# Patient Record
Sex: FEMALE | Race: OTHER | Employment: FULL TIME | URBAN - METROPOLITAN AREA
[De-identification: names, ages, dates, MRNs, and addresses within clinical notes are randomized per-mention and may not be internally consistent; named-entity substitution may affect disease eponyms.]

---

## 2020-11-14 ENCOUNTER — HOSPITAL ENCOUNTER (EMERGENCY)
Age: 25
Discharge: HOME | End: 2020-11-14
Attending: EMERGENCY MEDICINE
Payer: COMMERCIAL

## 2020-11-14 VITALS
SYSTOLIC BLOOD PRESSURE: 111 MMHG | DIASTOLIC BLOOD PRESSURE: 53 MMHG | HEART RATE: 58 BPM | WEIGHT: 190 LBS | BODY MASS INDEX: 33.66 KG/M2 | OXYGEN SATURATION: 100 % | TEMPERATURE: 98.6 F | HEIGHT: 63 IN | RESPIRATION RATE: 18 BRPM

## 2020-11-14 LAB
ALBUMIN SERPL-MCNC: 4.4 G/DL (ref 3.4–5)
ALP BLD-CCNC: 61 U/L (ref 40–129)
ALT SERPL-CCNC: 7 U/L (ref 10–40)
ANION GAP SERPL CALCULATED.3IONS-SCNC: 12 MMOL/L (ref 3–16)
AST SERPL-CCNC: 11 U/L (ref 15–37)
BACTERIA: ABNORMAL /HPF
BASOPHILS ABSOLUTE: 0 K/UL (ref 0–0.2)
BASOPHILS RELATIVE PERCENT: 0.7 %
BILIRUB SERPL-MCNC: <0.2 MG/DL (ref 0–1)
BILIRUBIN DIRECT: <0.2 MG/DL (ref 0–0.3)
BILIRUBIN URINE: NEGATIVE
BILIRUBIN, INDIRECT: ABNORMAL MG/DL (ref 0–1)
BLOOD, URINE: ABNORMAL
BUN BLDV-MCNC: 6 MG/DL (ref 7–20)
CALCIUM SERPL-MCNC: 8.9 MG/DL (ref 8.3–10.6)
CHLORIDE BLD-SCNC: 103 MMOL/L (ref 99–110)
CLARITY: ABNORMAL
CO2: 24 MMOL/L (ref 21–32)
COLOR: YELLOW
CREAT SERPL-MCNC: 0.5 MG/DL (ref 0.6–1.1)
EOSINOPHILS ABSOLUTE: 0.1 K/UL (ref 0–0.6)
EOSINOPHILS RELATIVE PERCENT: 0.8 %
EPITHELIAL CELLS, UA: ABNORMAL /HPF (ref 0–5)
GFR AFRICAN AMERICAN: >60
GFR NON-AFRICAN AMERICAN: >60
GLUCOSE BLD-MCNC: 102 MG/DL (ref 70–99)
GLUCOSE URINE: NEGATIVE MG/DL
HCG(URINE) PREGNANCY TEST: NEGATIVE
HCT VFR BLD CALC: 26.4 % (ref 36–48)
HEMATOLOGY PATH CONSULT: YES
HEMOGLOBIN: 7.8 G/DL (ref 12–16)
KETONES, URINE: NEGATIVE MG/DL
LEUKOCYTE ESTERASE, URINE: ABNORMAL
LIPASE: 19 U/L (ref 13–60)
LYMPHOCYTES ABSOLUTE: 2.3 K/UL (ref 1–5.1)
LYMPHOCYTES RELATIVE PERCENT: 37 %
MCH RBC QN AUTO: 17.4 PG (ref 26–34)
MCHC RBC AUTO-ENTMCNC: 29.7 G/DL (ref 31–36)
MCV RBC AUTO: 58.6 FL (ref 80–100)
MICROSCOPIC EXAMINATION: YES
MONOCYTES ABSOLUTE: 0.6 K/UL (ref 0–1.3)
MONOCYTES RELATIVE PERCENT: 9.1 %
MUCUS: ABNORMAL /LPF
NEUTROPHILS ABSOLUTE: 3.2 K/UL (ref 1.7–7.7)
NEUTROPHILS RELATIVE PERCENT: 52.4 %
NITRITE, URINE: NEGATIVE
PDW BLD-RTO: 21.7 % (ref 12.4–15.4)
PH UA: 6 (ref 5–8)
PLATELET # BLD: 201 K/UL (ref 135–450)
PMV BLD AUTO: 8.2 FL (ref 5–10.5)
POTASSIUM REFLEX MAGNESIUM: 3.9 MMOL/L (ref 3.5–5.1)
PROTEIN UA: 100 MG/DL
RBC # BLD: 4.5 M/UL (ref 4–5.2)
RBC UA: ABNORMAL /HPF (ref 0–4)
SODIUM BLD-SCNC: 139 MMOL/L (ref 136–145)
SPECIFIC GRAVITY UA: 1.02 (ref 1–1.03)
TOTAL PROTEIN: 7.8 G/DL (ref 6.4–8.2)
URINE TYPE: ABNORMAL
UROBILINOGEN, URINE: 0.2 E.U./DL
WBC # BLD: 6.1 K/UL (ref 4–11)
WBC UA: ABNORMAL /HPF (ref 0–5)

## 2020-11-14 PROCEDURE — 96365 THER/PROPH/DIAG IV INF INIT: CPT

## 2020-11-14 PROCEDURE — 80076 HEPATIC FUNCTION PANEL: CPT

## 2020-11-14 PROCEDURE — 84703 CHORIONIC GONADOTROPIN ASSAY: CPT

## 2020-11-14 PROCEDURE — 83690 ASSAY OF LIPASE: CPT

## 2020-11-14 PROCEDURE — 2580000003 HC RX 258: Performed by: EMERGENCY MEDICINE

## 2020-11-14 PROCEDURE — 96375 TX/PRO/DX INJ NEW DRUG ADDON: CPT

## 2020-11-14 PROCEDURE — 85025 COMPLETE CBC W/AUTO DIFF WBC: CPT

## 2020-11-14 PROCEDURE — 81001 URINALYSIS AUTO W/SCOPE: CPT

## 2020-11-14 PROCEDURE — 80048 BASIC METABOLIC PNL TOTAL CA: CPT

## 2020-11-14 PROCEDURE — 6360000002 HC RX W HCPCS: Performed by: EMERGENCY MEDICINE

## 2020-11-14 PROCEDURE — 99283 EMERGENCY DEPT VISIT LOW MDM: CPT

## 2020-11-14 PROCEDURE — 36415 COLL VENOUS BLD VENIPUNCTURE: CPT

## 2020-11-14 RX ORDER — CEFUROXIME AXETIL 250 MG/1
250 TABLET ORAL 2 TIMES DAILY
Qty: 20 TABLET | Refills: 0 | Status: SHIPPED | OUTPATIENT
Start: 2020-11-14 | End: 2020-11-24

## 2020-11-14 RX ORDER — KETOROLAC TROMETHAMINE 30 MG/ML
15 INJECTION, SOLUTION INTRAMUSCULAR; INTRAVENOUS ONCE
Status: COMPLETED | OUTPATIENT
Start: 2020-11-14 | End: 2020-11-14

## 2020-11-14 RX ORDER — 0.9 % SODIUM CHLORIDE 0.9 %
1000 INTRAVENOUS SOLUTION INTRAVENOUS ONCE
Status: COMPLETED | OUTPATIENT
Start: 2020-11-14 | End: 2020-11-14

## 2020-11-14 RX ADMIN — SODIUM CHLORIDE 1000 ML: 9 INJECTION, SOLUTION INTRAVENOUS at 01:47

## 2020-11-14 RX ADMIN — KETOROLAC TROMETHAMINE 15 MG: 30 INJECTION, SOLUTION INTRAMUSCULAR at 01:47

## 2020-11-14 RX ADMIN — CEFTRIAXONE 1 G: 1 INJECTION, POWDER, FOR SOLUTION INTRAMUSCULAR; INTRAVENOUS at 03:00

## 2020-11-14 ASSESSMENT — ENCOUNTER SYMPTOMS
ABDOMINAL PAIN: 1
DIARRHEA: 0
COUGH: 0
NAUSEA: 0
VOMITING: 0
SHORTNESS OF BREATH: 0

## 2020-11-14 ASSESSMENT — PAIN DESCRIPTION - ORIENTATION: ORIENTATION: LOWER

## 2020-11-14 ASSESSMENT — PAIN SCALES - GENERAL
PAINLEVEL_OUTOF10: 6
PAINLEVEL_OUTOF10: 6

## 2020-11-14 ASSESSMENT — PAIN DESCRIPTION - LOCATION: LOCATION: ABDOMEN;BACK

## 2020-11-14 ASSESSMENT — PAIN DESCRIPTION - PAIN TYPE: TYPE: ACUTE PAIN

## 2020-11-14 NOTE — ED PROVIDER NOTES
[address] [facility] [place] [facility]          ATTENDING PHYSICIAN NOTE       Date of evaluation: 11/14/2020    Chief Complaint     Lower Back Pain; Dysuria; and Abdominal Pain      History of Present Illness     Bronwyn Sandhoff is a 22 y.o. female who presents with complaints of right greater than left flank pain that began yesterday but has gotten worse tonight. She has no associated nausea and vomiting. The pain occasionally radiates around to the front right upper quadrant area. She states that it is at a 6-8 level. She reports urinary frequency but denies any dysuria or urgency and has not noted any blood in her urine. She denies any fevers or chills. The pain is made worse when she moves or when she takes in a deep breath. Review of Systems     Review of Systems   Constitutional: Negative for chills and fever. Respiratory: Negative for cough and shortness of breath. Cardiovascular: Negative for chest pain. Gastrointestinal: Positive for abdominal pain. Negative for diarrhea, nausea and vomiting. Genitourinary: Positive for flank pain and frequency. Negative for difficulty urinating and dysuria. All other systems reviewed and are negative. Past Medical, Surgical, Family, and Social History     She has no past medical history on file. She has no past surgical history on file. Her family history is not on file. She reports that she has never smoked. She has never used smokeless tobacco. She reports previous alcohol use. She reports that she does not use drugs. Medications     Previous Medications    No medications on file       Allergies     She is allergic to shellfish-derived products. Physical Exam     INITIAL VITALS: BP: 116/70, Temp: 98.6 °F (37 °C), Pulse: 85, Resp: 18, SpO2: 100 %   Physical Exam  Vitals signs and nursing note reviewed. Constitutional:       General: She is not in acute distress. Appearance: She is well-developed. She is not diaphoretic. Neck:      Musculoskeletal: Normal range of motion. Cardiovascular:      Rate and Rhythm: Normal rate and regular rhythm. Pulmonary:      Effort: Pulmonary effort is normal.   Abdominal:      General: Bowel sounds are normal. There is no distension. Palpations: Abdomen is soft. Tenderness: There is no abdominal tenderness. There is right CVA tenderness and left CVA tenderness. There is no guarding or rebound. Musculoskeletal: Normal range of motion. Skin:     General: Skin is warm and dry. Neurological:      Mental Status: She is alert and oriented to person, place, and time.    Psychiatric:         Behavior: Behavior normal.         Diagnostic Results     RADIOLOGY:  No orders to display       LABS:   Results for orders placed or performed during the hospital encounter of 11/14/20   Lipase   Result Value Ref Range    Lipase 19.0 13.0 - 60.0 U/L   Hepatic Function Panel   Result Value Ref Range    Total Protein 7.8 6.4 - 8.2 g/dL    Alb 4.4 3.4 - 5.0 g/dL    Alkaline Phosphatase 61 40 - 129 U/L    ALT 7 (L) 10 - 40 U/L    AST 11 (L) 15 - 37 U/L    Total Bilirubin <0.2 0.0 - 1.0 mg/dL    Bilirubin, Direct <0.2 0.0 - 0.3 mg/dL    Bilirubin, Indirect see below 0.0 - 1.0 mg/dL   Basic Metabolic Panel w/ Reflex to MG   Result Value Ref Range    Sodium 139 136 - 145 mmol/L    Potassium reflex Magnesium 3.9 3.5 - 5.1 mmol/L    Chloride 103 99 - 110 mmol/L    CO2 24 21 - 32 mmol/L    Anion Gap 12 3 - 16    Glucose 102 (H) 70 - 99 mg/dL    BUN 6 (L) 7 - 20 mg/dL    CREATININE 0.5 (L) 0.6 - 1.1 mg/dL    GFR Non-African American >60 >60    GFR African American >60 >60    Calcium 8.9 8.3 - 10.6 mg/dL   CBC Auto Differential   Result Value Ref Range    WBC 6.1 4.0 - 11.0 K/uL    RBC 4.50 4.00 - 5.20 M/uL    Hemoglobin 7.8 (L) 12.0 - 16.0 g/dL    Hematocrit 26.4 (L) 36.0 - 48.0 %    MCV 58.6 (L) 80.0 - 100.0 fL    MCH 17.4 (L) 26.0 - 34.0 pg    MCHC 29.7 (L) 31.0 - 36.0 g/dL    RDW 21.7 (H) 12.4 - 15.4 % Platelets 634 154 - 219 K/uL    MPV 8.2 5.0 - 10.5 fL    Neutrophils % 52.4 %    Lymphocytes % 37.0 %    Monocytes % 9.1 %    Eosinophils % 0.8 %    Basophils % 0.7 %    Neutrophils Absolute 3.2 1.7 - 7.7 K/uL    Lymphocytes Absolute 2.3 1.0 - 5.1 K/uL    Monocytes Absolute 0.6 0.0 - 1.3 K/uL    Eosinophils Absolute 0.1 0.0 - 0.6 K/uL    Basophils Absolute 0.0 0.0 - 0.2 K/uL   Pregnancy, Urine   Result Value Ref Range    HCG(Urine) Pregnancy Test Negative Detects HCG level >20 MIU/mL   Urinalysis, reflex to microscopic   Result Value Ref Range    Color, UA Yellow Straw/Yellow    Clarity, UA SL CLOUDY (A) Clear    Glucose, Ur Negative Negative mg/dL    Bilirubin Urine Negative Negative    Ketones, Urine Negative Negative mg/dL    Specific Gravity, UA 1.020 1.005 - 1.030    Blood, Urine LARGE (A) Negative    pH, UA 6.0 5.0 - 8.0    Protein,  (A) Negative mg/dL    Urobilinogen, Urine 0.2 <2.0 E.U./dL    Nitrite, Urine Negative Negative    Leukocyte Esterase, Urine LARGE (A) Negative    Microscopic Examination YES     Urine Type Voided    Microscopic Urinalysis   Result Value Ref Range    Mucus, UA 1+ (A) None Seen /LPF    WBC, UA  (A) 0 - 5 /HPF    RBC, UA 5-10 (A) 0 - 4 /HPF    Epithelial Cells, UA 6-10 (A) 0 - 5 /HPF    Bacteria, UA 1+ (A) None Seen /HPF       RECENT VITALS:  BP: 116/70,Temp: 98.6 °F (37 °C), Pulse: 85, Resp: 18, SpO2: 100 %       ED Course     Nursing Notes, Past Medical Hx, Past Surgical Hx, Social Hx,Allergies, and Family Hx were reviewed.     patient was given the following medications:  Orders Placed This Encounter   Medications    ketorolac (TORADOL) injection 15 mg    0.9 % sodium chloride bolus    cefUROXime (CEFTIN) 250 MG tablet     Sig: Take 1 tablet by mouth 2 times daily for 10 days     Dispense:  20 tablet     Refill:  0    cefTRIAXone (ROCEPHIN) 1 g IVPB in 50 mL D5W minibag     Order Specific Question:   Antimicrobial Indications     Answer:   Urinary Tract Infection CONSULTS:  None    MEDICAL DECISIONMAKING / ASSESSMENT / Serandreea Mayorga is a 22 y.o. female presenting with right greater than left flank pain that is been gradual in onset for the last couple of days. She is afebrile and not vomiting. She was tender on examination over the right flank especially but she had no abdominal tenderness. Symptoms were suggestive of a kidney etiology and laboratory evaluation reveals normal white counts and other labs but evidence of urinary tract infection making pyelonephritis the most likely diagnosis. I do not feel she has a kidney stone given the gradual onset of symptoms and relative paucity of blood in the urine. She is also not having any nausea or vomiting. I feel she will do well with oral antibiotics but will receive IV Rocephin here prior to discharge. Clinical Impression     1.  Acute pyelonephritis        Disposition     PATIENT REFERRED TO:  [facility] Emergency Department  [address]  [address] [facility]    If symptoms worsen    [place] [id number]  [phone]      to schedule new primary care followup      DISCHARGE MEDICATIONS:  New Prescriptions    CEFUROXIME (CEFTIN) 250 MG TABLET    Take 1 tablet by mouth 2 times daily for 10 days       DISPOSITION Discharge - Pending Orders Complete 11/14/2020 02:53:19 AM       Jostin Tubbs MD  11/14/20 [id number]

## 2020-11-14 NOTE — ED NOTES
Pt discharged to home, alert and oriented. Denies any questions about discharge instructions. Will follow up as directed. encouraged to return for any worsening symptoms.         Marni Lao RN  11/14/20 [id number]

## 2020-11-15 LAB — HEMATOLOGY PATH CONSULT: NORMAL

## 2021-02-07 ENCOUNTER — HOSPITAL ENCOUNTER (EMERGENCY)
Age: 26
Discharge: HOME | End: 2021-02-07
Attending: STUDENT IN AN ORGANIZED HEALTH CARE EDUCATION/TRAINING PROGRAM
Payer: COMMERCIAL

## 2021-02-07 VITALS
RESPIRATION RATE: 16 BRPM | SYSTOLIC BLOOD PRESSURE: 117 MMHG | OXYGEN SATURATION: 100 % | HEART RATE: 64 BPM | DIASTOLIC BLOOD PRESSURE: 70 MMHG

## 2021-02-07 DIAGNOSIS — M54.50 ACUTE BILATERAL LOW BACK PAIN WITHOUT SCIATICA: Primary | ICD-10-CM

## 2021-02-07 LAB
BACTERIA: ABNORMAL /HPF
BILIRUBIN URINE: ABNORMAL
BLOOD, URINE: NEGATIVE
CLARITY: ABNORMAL
COLOR: YELLOW
EPITHELIAL CELLS, UA: ABNORMAL /HPF (ref 0–5)
GLUCOSE URINE: NEGATIVE MG/DL
HCG(URINE) PREGNANCY TEST: NEGATIVE
KETONES, URINE: 15 MG/DL
LEUKOCYTE ESTERASE, URINE: ABNORMAL
MICROSCOPIC EXAMINATION: YES
NITRITE, URINE: NEGATIVE
PH UA: 6 (ref 5–8)
PROTEIN UA: NEGATIVE MG/DL
RBC UA: ABNORMAL /HPF (ref 0–4)
SPECIFIC GRAVITY UA: >=1.03 (ref 1–1.03)
URINE TYPE: ABNORMAL
UROBILINOGEN, URINE: 0.2 E.U./DL
WBC UA: ABNORMAL /HPF (ref 0–5)

## 2021-02-07 PROCEDURE — 81001 URINALYSIS AUTO W/SCOPE: CPT

## 2021-02-07 PROCEDURE — 84703 CHORIONIC GONADOTROPIN ASSAY: CPT

## 2021-02-07 PROCEDURE — 99283 EMERGENCY DEPT VISIT LOW MDM: CPT

## 2021-02-07 RX ORDER — CEPHALEXIN 500 MG/1
500 CAPSULE ORAL 2 TIMES DAILY
Qty: 14 CAPSULE | Refills: 0 | Status: SHIPPED | OUTPATIENT
Start: 2021-02-07 | End: 2021-02-14

## 2021-02-07 ASSESSMENT — ENCOUNTER SYMPTOMS
EYES NEGATIVE: 1
ALLERGIC/IMMUNOLOGIC NEGATIVE: 1
BACK PAIN: 1
VOMITING: 0
NAUSEA: 0
RESPIRATORY NEGATIVE: 1

## 2021-02-07 ASSESSMENT — PAIN DESCRIPTION - DESCRIPTORS: DESCRIPTORS: ACHING

## 2021-02-07 ASSESSMENT — PAIN DESCRIPTION - FREQUENCY: FREQUENCY: CONTINUOUS

## 2021-02-07 ASSESSMENT — PAIN DESCRIPTION - ORIENTATION: ORIENTATION: LOWER

## 2021-02-07 NOTE — ED PROVIDER NOTES
ED Attending Attestation Note     Date of evaluation: 2/7/2021    This patient was seen by the resident. I have seen and examined the patient, agree with the workup, evaluation, management and diagnosis. The care plan has been discussed. My assessment reveals 21 y/o F who presents for lower back pain. Worse with bending/twisting. She does not recall recent trauma or heavy lifting, but has been spending quite a bit of time sitting in a chair. Denies midline pain, saddle paraesthesias, leg weakness, urinary retention, dysuria, frequency, or fever. She has not taken anything at home for the pain. No midline or CVA tenderness on exam, abd soft without rebound or guarding. BLE strength intact. UPT negative. UA with bacteria and trace leukocyte esterase. Although her presentation seems most consistent with musculoskeletal pain, gave prescription to cover for potential UTI. Counseled on supportive care. ED precautions for worsening symptoms.      Ruchi Sequeira MD  02/07/21 [id number]

## 2021-02-07 NOTE — ED PROVIDER NOTES
[facility] ENCOUNTER          EM RESIDENT NOTE       Date of evaluation: 2/7/2021    Chief Complaint     Back Pain (pt thinks she has kidney infection)      History of Present Illness     Alyssa Cárdenas is a 22 y.o. female with no significant past medical history that is presenting with back pain. The patient states that she started to develop left greater than right paraspinal lumbar back pain that was similar to when she had pyelonephritis 2 months ago and was treated with antibiotics. The patient states that she has not had any pain with urination or any hematuria. She notes that she is unclear if she is pregnant. Patient is not have any abdominal pain currently. She does state that she has not been able to have a bowel movement for the past 3 days; prior to then, the patient states she did not have any diarrhea or bloody stools. With the exception of above, the patient does not endorse any alleviating or aggravating factors, and does not note any further complaints. Review of Systems     Review of Systems   Constitutional: Negative for fever. HENT: Negative. Eyes: Negative. Respiratory: Negative. Cardiovascular: Negative for chest pain. Gastrointestinal: Negative for nausea and vomiting. Endocrine: Negative. Genitourinary: Positive for flank pain. Negative for decreased urine volume, dysuria, hematuria, urgency, vaginal bleeding, vaginal discharge and vaginal pain. Musculoskeletal: Positive for back pain. Allergic/Immunologic: Negative. Neurological: Negative. Hematological: Negative. Psychiatric/Behavioral: Negative. Past Medical, Surgical, Family, and Social History     She has no past medical history on file. She has no past surgical history on file. Her family history is not on file. She reports that she has never smoked. She has never used smokeless tobacco. She reports previous alcohol use.  She reports that she does not use drugs.    Medications     Previous Medications    No medications on file       Allergies     She is allergic to shellfish-derived products. Physical Exam     IINITIAL VITALS: BP: 117/70,  , Pulse: 64, Resp: 16, SpO2: 100 %     General: Patient is a 22 y.o. female who is not in acute distress    HEENT: Head atraumatic. Pupils equal, round, and reactive to light. Sclera clear. Mucous membranes moist. Oropharynx clear. Neck:  Supple. No lymphadenopathy. Pulmonary:   Normal respiratory effort. Lungs clear to auscultation bilaterally, with no wheezes, rales or rhonchi. Cardiac:  Regular rate and rhythm. Normal S1, S2. No murmurs, rubs or gallops. Abdomen:  Soft. Non-tender. Non-distended. No rebound tenderness or guarding. Musculoskeletal:  No obvious deformities. TTP to the left lumbar paraspinal area and right lumbar paraspinal area, L>R. Vascular:  Radial pulses 2+ bilaterally. Skin:  Warm, dry, well-perfused. No rash. Neuro: AAOx4. CN II-XII grossly intact. Normal gait. Sensation grossly intact. Strength grossly equal and symmetric. Extremities:  No peripheral edema.     Diagnostic Results     RADIOLOGY:  No orders to display       LABS:   Results for orders placed or performed during the hospital encounter of 02/07/21   Urinalysis, reflex to microscopic (Lab)   Result Value Ref Range    Color, UA Yellow Straw/Yellow    Clarity, UA SL CLOUDY (A) Clear    Glucose, Ur Negative Negative mg/dL    Bilirubin Urine SMALL (A) Negative    Ketones, Urine 15 (A) Negative mg/dL    Specific Gravity, UA >=1.030 1.005 - 1.030    Blood, Urine Negative Negative    pH, UA 6.0 5.0 - 8.0    Protein, UA Negative Negative mg/dL    Urobilinogen, Urine 0.2 <2.0 E.U./dL    Nitrite, Urine Negative Negative    Leukocyte Esterase, Urine TRACE (A) Negative    Microscopic Examination YES     Urine Type NotGiven    Pregnancy, urine   Result Value Ref Range    HCG(Urine) Pregnancy Test Negative Detects HCG level >20 MIU/mL   Microscopic Urinalysis   Result Value Ref Range    WBC, UA 3-5 0 - 5 /HPF    RBC, UA None seen 0 - 4 /HPF    Epithelial Cells, UA 2-5 0 - 5 /HPF    Bacteria, UA 2+ (A) None Seen /HPF         RECENT VITALS:  BP: 117/70,  , Pulse: 64, Resp: 16, SpO2: 100 %     Procedures     None    ED Course     Nursing Notes, Past Medical Hx, Past Surgical Hx, Social Hx, Allergies, and Family Hx were reviewed. The patient was given the following medications:  No orders of the defined types were placed in this encounter. CONSULTS:  None    MEDICAL [place] Shaver / TAURUS / Ehsan Jeannette is a 22 y.o. female that is presenting with back pain. The patient's UA was significant for 2+ bacteria and very mild WBC count; after discussion with the patient, we discussed how this could likely not be a UTI and is rather due to musculoskeletal back pain given non-impressive UA. After risk benefit discussion, patient stated that she would rather have a course of antibiotics for her symptoms and therefore was given a course of Keflex for 7 days to treat for potential UTI. Patient also encouraged to take ibuprofen every 8 hours for pain as well and was agreeable to plan. This patient was also evaluated by the attending physician. All care plans were discussed and agreed upon. Clinical Impression     1. Acute bilateral low back pain without sciatica        Disposition     PATIENT REFERRED TO:  No follow-up provider specified.     DISCHARGE MEDICATIONS:  New Prescriptions    No medications on file       Elizabeth Freeman MD  Resident  02/07/21 [id number]

## 2021-05-04 ENCOUNTER — APPOINTMENT (OUTPATIENT)
Dept: GENERAL RADIOLOGY | Age: 26
End: 2021-05-04
Payer: COMMERCIAL

## 2021-05-04 ENCOUNTER — HOSPITAL ENCOUNTER (EMERGENCY)
Age: 26
Discharge: HOME | End: 2021-05-04
Payer: COMMERCIAL

## 2021-05-04 VITALS
TEMPERATURE: 97.4 F | RESPIRATION RATE: 18 BRPM | OXYGEN SATURATION: 100 % | DIASTOLIC BLOOD PRESSURE: 75 MMHG | SYSTOLIC BLOOD PRESSURE: 110 MMHG | HEART RATE: 77 BPM

## 2021-05-04 DIAGNOSIS — M25.571 ACUTE RIGHT ANKLE PAIN: ICD-10-CM

## 2021-05-04 DIAGNOSIS — M25.532 ACUTE WRIST PAIN, LEFT: Primary | ICD-10-CM
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
## 2021-05-04 PROCEDURE — 73630 X-RAY EXAM OF FOOT: CPT

## 2021-05-04 PROCEDURE — 73610 X-RAY EXAM OF ANKLE: CPT

## 2021-05-04 PROCEDURE — 99284 EMERGENCY DEPT VISIT MOD MDM: CPT

## 2021-05-04 PROCEDURE — 73110 X-RAY EXAM OF WRIST: CPT

## 2021-05-04 RX ORDER — NAPROXEN 500 MG/1
500 TABLET ORAL 2 TIMES DAILY PRN
Qty: 30 TABLET | Refills: 0 | Status: SHIPPED | OUTPATIENT
Start: 2021-05-04

## 2021-05-04 ASSESSMENT — PAIN DESCRIPTION - LOCATION
LOCATION_2: WRIST
LOCATION: ANKLE

## 2021-05-04 ASSESSMENT — ENCOUNTER SYMPTOMS
SHORTNESS OF BREATH: 0
WHEEZING: 0
ABDOMINAL PAIN: 0
NAUSEA: 0
SORE THROAT: 0
DIARRHEA: 0
VOMITING: 0
COUGH: 0

## 2021-05-04 ASSESSMENT — PAIN DESCRIPTION - INTENSITY: RATING_2: 3

## 2021-05-04 ASSESSMENT — PAIN SCALES - GENERAL: PAINLEVEL_OUTOF10: 5

## 2021-05-04 ASSESSMENT — PAIN DESCRIPTION - DESCRIPTORS
DESCRIPTORS: ACHING
DESCRIPTORS_2: ACHING

## 2021-05-04 ASSESSMENT — PAIN DESCRIPTION - ORIENTATION
ORIENTATION_2: LEFT
ORIENTATION: RIGHT

## 2021-05-04 ASSESSMENT — PAIN DESCRIPTION - FREQUENCY: FREQUENCY: CONTINUOUS

## 2021-05-04 NOTE — ED PROVIDER NOTES
myalgias. Left wrist and right ankle pain   Skin: Negative for rash. Neurological: Negative for dizziness, seizures, weakness and headaches. Hematological: Does not bruise/bleed easily. Psychiatric/Behavioral: Negative for hallucinations and suicidal ideas. All other systems reviewed and are negative. Past Medical, Surgical, Family, and Social History     She has a past medical history of Anemia. She has a past surgical history that includes  section and Mouth surgery. Her family history is not on file. She reports that she has never smoked. She has never used smokeless tobacco. She reports previous alcohol use. She reports that she does not use drugs. Medications     Discharge Medication List as of 2021  9:10 PM          Allergies     She is allergic to shellfish-derived products. Physical Exam     INITIAL VITALS: BP: 117/68, Temp: 97.4 °F (36.3 °C), Pulse: 77, Resp: 18, SpO2: 100 %  Physical Exam  Vitals signs and nursing note reviewed. Constitutional:       General: She is not in acute distress. Appearance: She is well-developed. She is not diaphoretic. HENT:      Head: Normocephalic and atraumatic. Eyes:      General: No scleral icterus. Conjunctiva/sclera: Conjunctivae normal.      Pupils: Pupils are equal, round, and reactive to light. Neck:      Musculoskeletal: Normal range of motion and neck supple. Vascular: No JVD. Cardiovascular:      Rate and Rhythm: Normal rate and regular rhythm. Heart sounds: Normal heart sounds. Pulmonary:      Effort: Pulmonary effort is normal. No respiratory distress. Breath sounds: Normal breath sounds. No stridor. No wheezing or rales. Chest:      Chest wall: No tenderness. Abdominal:      General: There is no distension. Palpations: Abdomen is soft. Tenderness: There is no abdominal tenderness. Musculoskeletal: Normal range of motion.       Left wrist: She exhibits tenderness and bony tripped on an area of even pavement, causing her to fall. She complains of pain of the left wrist with flexion and extension. No anatomical snuffbox tenderness. There is no visible swelling or ecchymosis. In addition. The patient is experiencing pain over the inferior lateral malleolus and over the fifth metatarsal of the right foot. Tenderness to palpation over this region with minimal swelling. No ecchymosis. X-rays were performed of the wrist and ankle and demonstrate no osseous or articular abnormalities. Patient still complains of pain with bearing weight on the right foot. We will start the patient on RICE therapy, naproxen, provide her with crutches, weightbearing as tolerated and have the patient follow-up with orthopedic surgery. I discussed this plan at length the patient who verbalizes understanding and is in agreement. The patient is currently stable and will be discharged home for continued self-care. Please see patient's AVS for additional discharge instructions. Clinical Impression     1. Acute wrist pain, left    2. Acute right ankle pain    3. Fall on same level from slipping, tripping or stumbling, initial encounter        Disposition     PATIENT REFERRED TO:  Keysha Sharp MD  [facility] [address]    Schedule an appointment as soon as possible for a visit in 1 week  for follow-up on scaphoid tenderness after a fall.     [facility] Emergency Department  [address] [place] [address]  [phone]  Go to   If symptoms worsen    [facility]  [address]   [phone]      As needed      DISCHARGE MEDICATIONS:  Discharge Medication List as of 5/4/2021  9:10 PM      START taking these medications    Details   naproxen (NAPROSYN) 500 MG tablet Take 1 tablet by mouth 2 times daily as needed for Pain, Disp-30 tablet, R-[id number]             DISPOSITION Decision To Discharge 05/04/2021 56:73:32 PM       [facility] [address] [place]  05/28/21 4529

## 2021-05-05 NOTE — ED NOTES
While leaving, pt requested crutches. Bernie Tavarez wrote orders for crutches. Crutches were fitted to patient by this RN and education given. Pt verbalized understanding.      Oliver Portillo RN  05/04/21 [id number]

## 2021-05-05 NOTE — ED NOTES
Patient prepared for and ready to be discharged. Patient discharged at this time in no acute distress after verbalizing understanding of discharge instructions. Patient left after receiving After Visit Summary instructions.         Deanna Field RN  05/04/21 [id number]

## 2021-05-07 ENCOUNTER — TELEPHONE (OUTPATIENT)
Dept: ORTHOPEDIC SURGERY | Age: 26
End: 2021-05-07

## 2021-05-07 NOTE — TELEPHONE ENCOUNTER
Appointment Request     Patient requesting earlier appointment: Yes  Appointment offered to patient: YES   Patient Contact Number: [phone]    PATIENT WAS SEEN IN THE ER ON 5/4/21. PATIENT NEEDS TO BE SEEN AROUND MAY 11, 2021.     FIRST AVAIL IS 5/25/21    PLEASE CALL BACK PATIENT AT THE ABOVE NUMBER TO SCHEDULE

## 2021-05-10 NOTE — TELEPHONE ENCOUNTER
SHERRI for patient to return call. Patient has not been scheduled for an appointment. Asked that she call back to schedule.  X-rays were read as negative in ER, please offer first available with any provider who treats wrists

## 2021-05-12 ENCOUNTER — TELEPHONE (OUTPATIENT)
Dept: ORTHOPEDIC SURGERY | Age: 26
End: 2021-05-12

## 2021-05-12 ENCOUNTER — OFFICE VISIT (OUTPATIENT)
Dept: ORTHOPEDIC SURGERY | Age: 26
End: 2021-05-12
Payer: COMMERCIAL

## 2021-05-12 VITALS — HEIGHT: 63 IN | BODY MASS INDEX: 33.67 KG/M2 | WEIGHT: 190.04 LBS

## 2021-05-12 DIAGNOSIS — M25.332 POST-TRAUMATIC INSTABILITY OF DISTAL RADIOULNAR JOINT OF LEFT WRIST: Primary | ICD-10-CM

## 2021-05-12 DIAGNOSIS — M25.532 WRIST PAIN, ACUTE, LEFT: ICD-10-CM

## 2021-05-12 PROCEDURE — G8427 DOCREV CUR MEDS BY ELIG CLIN: HCPCS | Performed by: ORTHOPAEDIC SURGERY

## 2021-05-12 PROCEDURE — 99203 OFFICE O/P NEW LOW 30 MIN: CPT | Performed by: ORTHOPAEDIC SURGERY

## 2021-05-12 PROCEDURE — G8417 CALC BMI ABV UP PARAM F/U: HCPCS | Performed by: ORTHOPAEDIC SURGERY

## 2021-05-12 PROCEDURE — 29105 APPLICATION LONG ARM SPLINT: CPT | Performed by: ORTHOPAEDIC SURGERY

## 2021-05-12 PROCEDURE — 1036F TOBACCO NON-USER: CPT | Performed by: ORTHOPAEDIC SURGERY

## 2021-05-12 ASSESSMENT — ENCOUNTER SYMPTOMS: BACK PAIN: 1

## 2021-05-12 NOTE — PROGRESS NOTES
12 [facility]  Office Visit  Shoulder Pain  Date:  2021    Name:  Brittnee Mathur  Address:  [address] [place], [place] [address] [id number]    :  1995      Age:   22 y.o.    SSN:  [id number]      Medical Record Number:  <[id number]>    Chief Complaint:    Left wrist pain    HPI:   Brittnee Mathur is a 22 y.o. right-hand-dominant female who presents today as new patient evaluation for left wrist injury. The patient was running to catch a bus on 5/3/2021 when she tripped and landed on her outstretched wrists. She noted immediate pain to the left wrist and the right ankle. The pain got worse throughout the day and the following day she went to the [facility] Emergency Room where she was diagnosed with a potential wrist sprain versus occult scaphoid fracture and placed into a splint. The patient is wearing her splint. She continues to have pain in the wrist.  She denies any problems with the wrist prior. She denies any numbness and tingling down the arm. Pain Assessment  Location of Pain: Wrist  Location Modifiers: Left  Severity of Pain: 1  Quality of Pain: Sharp(shooting)  Duration of Pain: Persistent  Frequency of Pain: Intermittent  Date Pain First Started: 21  Aggravating Factors: (movement)  Limiting Behavior: Yes  Relieving Factors: Rest  Result of Injury: Yes  Work-Related Injury: No  Are there other pain locations you wish to document?: No    Past History:  Past Medical History:   Diagnosis Date    Anemia        Past Surgical History:   Procedure Laterality Date     SECTION      MOUTH SURGERY         Social History     Tobacco Use    Smoking status: Never Smoker    Smokeless tobacco: Never Used   Substance Use Topics    Alcohol use: Not Currently    Drug use: Never        Family History:  family history is not on file.          Current Outpatient Medications:     naproxen (NAPROSYN) 500 MG tablet, Take 1 tablet by mouth 2 times daily as needed for Pain, Disp: 30 tablet, Rfl: 0    Allergies   Allergen Reactions    Shellfish-Derived Products          Review of Systems: A 14 point review of systems available in the scanned medical record as documented by the patient on 5/12/21. Today's review pertinent items are noted in HPI. Review of Systems   Musculoskeletal: Positive for back pain and neck pain. Fractures/other injuries   All other systems reviewed and are negative. Done: 5/12/2021        Physical Exam:  Ht 5' 2.99\" (1.6 m)   Wt 190 lb 0.6 oz (86.2 kg)   LMP 04/13/2021   BMI 33.67 kg/m²       General: No acute distress, well nourished  CV: No obvious peripheral edema. Normal peripheral pulses  Neuro: Alert & oriented x 3  Psych: Normal mood and affect    L wrist exam     Inspection:  Held in a normal.  Mild swelling about the DRUJ and dorsal wrist.     Palpation:  Point tender over the dorsal aspect of the wrist and the DRUJ specifically.     Range of Motion: Limited flexion and extension of the wrist with minimal supination pronation and radial and ulnar deviation due to pain over the ulnar side of the wrist.     Strength:  4/5 flexion and extension     Stability: Positive instability to check at DRUJ     Special Tests: deferred     Other findings: The skin is warm dry and well perfused. Distally neurovascularly intact both motor and sensory.       R comparison wrist exam     Inspection:  Held in a normal. No swelling, ecchymosis, or erythema about the wrist. No atrophy appreciated.     Palpation: No point tenderness.     Range of Motion: Full passive and active ROM.    Strength:  5/5 flexion and extension     Stability: No gross instability     Special Tests: deferred     Other findings: The skin is warm dry and well perfused. Distally neurovascularly intact both motor and sensory.           Radiographic:  X-rays obtained and reviewed in office, reviewed and interpreted by me today:    Findings:   Views: 3 views left wrist Weight bearing: No  Findings: 3 views of the left wrist taken in the office today demonstrate no acute osseous abnormalities. Compared to prior films DRUJ appears well aligned on the lateral view. No occult scaphoid fracture noted on x-rays. Appropriate scapholunate angulation. Capitate is articulating with the lunate. Previous comparison films: 5/4/2021    Impression:  1. No acute osseous abnormalities left wrist    Assessment:  Angel Larry is a 22 y.o. female presenting with:  1. Left wrist DRUJ ligamentous injury with increased instability on shuck testing    Impression:  Encounter Diagnoses   Name Primary? 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 We will keep her in this cast for 2 weeks to impart stability to the distal radial ulnar joint. After 2 weeks we will see her back and get her out of the cast.  We will also initiate physical therapy afterwards to make sure she does not develop elbow stiffness. She may require a removable wrist brace at follow-up. We did discuss cast care and to make sure that the cast does not get wet.   Not to stick anything down the cast.  We described the symptoms of compartment syndrome and if she notices any of those then she will need to call us and we can change the cast.  Otherwise we will see the patient in 2 weeks for cast removal.      Neelima Sargent DO  [facility] Clinical Fellow    The encounter with Angel Larry was supervised by Dr. Maritza Sanders who personally examined the patient and reviewed the plan.     This dictation was performed with a verbal recognition program (DRAGON) and it was checked for errors. It is possible that there are still dictated errors within this office note. If so, please bring any errors to my attention for an addendum. All efforts were made to ensure that this office note is accurate.   ________________  I was physically present and personally supervised the Orthopaedic Sports Medicine Fellow in the evaluation and development of a treatment plan for this patient. I personally interviewed the patient and performed a physical examination. In addition, I discussed the patient's condition and treatment options with them. I have also reviewed and agree with the past medical, family and social history unless otherwise noted. All of the patient's questions were answered. Vamsi Jimenez MD, PhD  5/12/2021 [[email or web address]] : ISantos Shabtai MD, personally saw and evaluated the patient and developed the plan as documented above. I concur or have edited the note as appropriate.\par

## 2021-05-12 NOTE — TELEPHONE ENCOUNTER
General Question     Subject: CARE OF CAST  Patient and /or Facility Request: PATIENT WOULD LIKE TO KNOW THE BEST PRACTICES TO PROTECT CAST FROM GETTING WET AND AVOID HAVING TO COME BACK IN.    Contact Number: [phone]

## 2021-05-12 NOTE — PROGRESS NOTES
Review of Systems   Musculoskeletal: Positive for back pain and neck pain. Fractures/other injuries   All other systems reviewed and are negative.       Done: 5/12/2021

## 2021-05-26 ENCOUNTER — OFFICE VISIT (OUTPATIENT)
Dept: ORTHOPEDIC SURGERY | Age: 26
End: 2021-05-26
Payer: COMMERCIAL

## 2021-05-26 VITALS — HEIGHT: 63 IN | WEIGHT: 190.04 LBS | BODY MASS INDEX: 33.67 KG/M2

## 2021-05-26 DIAGNOSIS — M25.332 POST-TRAUMATIC INSTABILITY OF DISTAL RADIOULNAR JOINT OF LEFT WRIST: Primary | ICD-10-CM

## 2021-05-26 PROCEDURE — 99212 OFFICE O/P EST SF 10 MIN: CPT | Performed by: ORTHOPAEDIC SURGERY

## 2021-05-26 PROCEDURE — G8427 DOCREV CUR MEDS BY ELIG CLIN: HCPCS | Performed by: ORTHOPAEDIC SURGERY

## 2021-05-26 PROCEDURE — 1036F TOBACCO NON-USER: CPT | Performed by: ORTHOPAEDIC SURGERY

## 2021-05-26 PROCEDURE — L3908 WHO COCK-UP NONMOLDE PRE OTS: HCPCS | Performed by: ORTHOPAEDIC SURGERY

## 2021-05-26 PROCEDURE — G8417 CALC BMI ABV UP PARAM F/U: HCPCS | Performed by: ORTHOPAEDIC SURGERY

## 2021-05-26 NOTE — PROGRESS NOTES
Review of Systems    Done: 5/12/21  Patient has had no medical changes since last evaluated
HPI.      Review of Systems    Done: 5/12/21  Patient has had no medical changes since last evaluated        Physical Exam:  Ht 5' 2.99\" (1.6 m)   Wt 190 lb 0.6 oz (86.2 kg)   BMI 33.67 kg/m²     General: No acute distress, well nourished  CV: No obvious peripheral edema. Normal peripheral pulses  Neuro: Alert & oriented x 3  Psych: Normal mood and affect    L wrist exam     Inspection: Edilson Joseph in a normal alignment, no swelling to the DRUJ     Palpation:   Minimally tender about the DRUJ.     Range of Motion:  Improved flexion extension of the wrist.  Full supination pronation as well as ulnar and radial deviation.     Strength:  4/5 flexion and extension     Stability:  Slight increase panicky shock compared to right wrist     Special Tests: deferred     Other findings: The skin is warm dry and well perfused. Distally neurovascularly intact both motor and sensory.           Radiographic:  No new x-rays taken today. Assessment:  Angel Larry is a 22 y.o. female with:  1. Ligamentous DRUJ injury 3 weeks out    Impression:  Encounter Diagnosis   Name Primary? 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 Plan:   Pertinent imaging was reviewed. The etiology, natural history, and treatment options for the disorder were discussed. The roles of activity modifications, medications, injections, physical therapy, and surgical interventions were all described to the patient and questions were answered. The patient is doing well. We will place her into a removable cock-up wrist brace to be worn full-time for the next 2 weeks. At that time she can remove the brace and she should be fully recovered at that time. If she has any weakness or issues we can get her set up with occupational therapy for hand work. We will see her back in 3 weeks.   If she is doing well at that point she can call and cancel